# Patient Record
Sex: MALE | Race: WHITE | NOT HISPANIC OR LATINO
[De-identification: names, ages, dates, MRNs, and addresses within clinical notes are randomized per-mention and may not be internally consistent; named-entity substitution may affect disease eponyms.]

---

## 2018-12-12 ENCOUNTER — APPOINTMENT (OUTPATIENT)
Dept: PULMONOLOGY | Facility: CLINIC | Age: 53
End: 2018-12-12
Payer: COMMERCIAL

## 2018-12-12 VITALS
RESPIRATION RATE: 16 BRPM | DIASTOLIC BLOOD PRESSURE: 89 MMHG | WEIGHT: 238 LBS | OXYGEN SATURATION: 97 % | TEMPERATURE: 97.7 F | HEIGHT: 71 IN | SYSTOLIC BLOOD PRESSURE: 145 MMHG | HEART RATE: 59 BPM | BODY MASS INDEX: 33.32 KG/M2

## 2018-12-12 DIAGNOSIS — Z87.898 PERSONAL HISTORY OF OTHER SPECIFIED CONDITIONS: ICD-10-CM

## 2018-12-12 PROCEDURE — 99204 OFFICE O/P NEW MOD 45 MIN: CPT

## 2019-01-04 ENCOUNTER — APPOINTMENT (OUTPATIENT)
Dept: ORTHOPEDIC SURGERY | Facility: CLINIC | Age: 54
End: 2019-01-04
Payer: COMMERCIAL

## 2019-01-04 VITALS
DIASTOLIC BLOOD PRESSURE: 78 MMHG | SYSTOLIC BLOOD PRESSURE: 142 MMHG | WEIGHT: 235 LBS | BODY MASS INDEX: 35.61 KG/M2 | HEIGHT: 68 IN | HEART RATE: 73 BPM

## 2019-01-04 DIAGNOSIS — L40.9 PSORIASIS, UNSPECIFIED: ICD-10-CM

## 2019-01-04 DIAGNOSIS — Z86.59 PERSONAL HISTORY OF OTHER MENTAL AND BEHAVIORAL DISORDERS: ICD-10-CM

## 2019-01-04 PROCEDURE — 99244 OFF/OP CNSLTJ NEW/EST MOD 40: CPT

## 2019-01-04 PROCEDURE — 72100 X-RAY EXAM L-S SPINE 2/3 VWS: CPT

## 2019-01-04 RX ORDER — IBUPROFEN 800 MG/1
800 TABLET, FILM COATED ORAL
Qty: 90 | Refills: 0 | Status: ACTIVE | COMMUNITY
Start: 2019-01-04 | End: 1900-01-01

## 2019-01-07 PROBLEM — L40.9 PSORIASIS: Status: ACTIVE | Noted: 2019-01-07

## 2019-01-07 PROBLEM — Z86.59 HISTORY OF PANIC ATTACKS: Status: RESOLVED | Noted: 2019-01-07 | Resolved: 2019-01-07

## 2019-01-07 RX ORDER — DULOXETINE HYDROCHLORIDE 60 MG/1
60 CAPSULE, DELAYED RELEASE PELLETS ORAL
Qty: 30 | Refills: 0 | Status: ACTIVE | COMMUNITY
Start: 2018-12-19

## 2019-01-28 ENCOUNTER — APPOINTMENT (OUTPATIENT)
Dept: ORTHOPEDIC SURGERY | Facility: CLINIC | Age: 54
End: 2019-01-28
Payer: COMMERCIAL

## 2019-01-28 PROCEDURE — 99214 OFFICE O/P EST MOD 30 MIN: CPT

## 2019-01-28 NOTE — PHYSICAL EXAM
[de-identified] : He appears comfortable today. Motor power is normal in her lower extremity groups and straight leg raising is negative to 90°.

## 2019-01-28 NOTE — DISCUSSION/SUMMARY
[Medication Risks Reviewed] : Medication risks reviewed [de-identified] : He will continue the ibuprofen 800 mg 3 times a day for 5 or 6 days after he has no ache or pain, only a discomfort her last. He was a low dose to 600 mg 3 times a day and I will see him for followup in 4 weeks. He will call if there are problems with the medication.

## 2019-01-28 NOTE — HISTORY OF PRESENT ILLNESS
[de-identified] : His problems tolerating the ibuprofen. The lower back pain which she describes as constant and graded as an 8 at the time of his last visit it is now intermittent and no worse than an aching. It continues to be worse with standing and sitting for prolonged periods of time. There are no new symptoms. [Pain Location] : pain [Improving] : improving [3] : an average pain level of 3/10 [Intermit.] : ~He/She~ states the symptoms seem to be intermittent

## 2019-01-28 NOTE — REASON FOR VISIT
[Follow-Up Visit] : a follow-up visit for [Degenerative Joint Disease] : degenerative joint disease [Back Pain] : back pain [FreeTextEntry2] : Lower back pain

## 2019-02-06 ENCOUNTER — APPOINTMENT (OUTPATIENT)
Dept: ORTHOPEDIC SURGERY | Facility: CLINIC | Age: 54
End: 2019-02-06
Payer: COMMERCIAL

## 2019-02-06 PROCEDURE — 99214 OFFICE O/P EST MOD 30 MIN: CPT

## 2019-02-06 NOTE — HISTORY OF PRESENT ILLNESS
[de-identified] : He was making very nice improvement on the ibuprofen and his symptoms have gone from a constant 8 to an intermittent aching graded as a 3. She was twisting 5 days ago when lifting a computer bag and had a sudden worsening of his symptoms. He has had some nausea from the ibuprofen. [Pain Location] : pain [Worsening] : worsening [5] : a current pain level of 5/10

## 2019-02-06 NOTE — REASON FOR VISIT
[Follow-Up Visit] : a follow-up visit for [Degenerative Joint Disease] : degenerative joint disease [Back Pain] : back pain [FreeTextEntry2] : Low back pain

## 2019-02-06 NOTE — DISCUSSION/SUMMARY
[Medication Risks Reviewed] : Medication risks reviewed [de-identified] : He is instructed on omeprazole 20 mg once a day and will resume the ibuprofen at 800 mg 3 times a day as recommended at the time of his last visit. He has a followup appointment later this month.

## 2019-02-07 ENCOUNTER — CHART COPY (OUTPATIENT)
Age: 54
End: 2019-02-07

## 2019-02-25 ENCOUNTER — APPOINTMENT (OUTPATIENT)
Dept: ORTHOPEDIC SURGERY | Facility: CLINIC | Age: 54
End: 2019-02-25
Payer: COMMERCIAL

## 2019-02-25 PROCEDURE — 99214 OFFICE O/P EST MOD 30 MIN: CPT

## 2019-02-25 NOTE — REASON FOR VISIT
[Follow-Up Visit] : a follow-up visit for [Back Pain] : back pain [FreeTextEntry2] : Lower back pain

## 2019-02-25 NOTE — PHYSICAL EXAM
[de-identified] : He appears comfortable today. Reflexes are 2+ and symmetrical with normal motor power and a negative straight leg raising to 90°.

## 2019-02-25 NOTE — DISCUSSION/SUMMARY
[Medication Risks Reviewed] : Medication risks reviewed [de-identified] : he will increase the ibuprofen to 600 mg 3 times a day and continue the omeprazole. I will see him for followup in 3 weeks.

## 2019-02-25 NOTE — HISTORY OF PRESENT ILLNESS
[de-identified] : He was initially seen with lower back pain in early January and several weeks later the symptoms went from constant and quite severe to intermittent doing markedly better. He had a temporary worsening of his symptoms bending over to  his computer bag and since then the symptoms have again improved. He had some GI upset with the ibuprofen that resolved with omeprazole. Currently the symptoms on an intermittent aching in the lower back but he is taking the ibuprofen 600 mg only once a day.

## 2019-03-06 NOTE — ASSESSMENT
[FreeTextEntry1] : Patient is a 53-year-old male with a history of alcoholism, hypertension, hyperlipidemia, and hiatal hernia. \par Last seen in 2013 for severe GAGE. He has been using CPAP at a pressure of 10 cm H2O. Presents today as machine is broken and needs a new machine. He has lost 20 lbs since his prior visit. Does not have his device or data card with him for review of therapeutic data. Will order new APAP machine through [facility]. Instructed to contact office if don't hear from [facility] in 1-2 weeks. The compliance criteria were discussed and he was instructed to follow up with us within 2-3 months receiving the equipment.

## 2019-03-06 NOTE — HISTORY OF PRESENT ILLNESS
[FreeTextEntry1] : Patient is a 53 year old male with a history of alcoholism, hypertension, hyperlipidemia, and hiatal hernia here to re-establish care for GAGE.\par \par He was last seen in 2013. PSG (7/10/2013) showed severe GAGE with an AHI of 43/hr and a T90 of 5.9%.  Primary symptoms were excessive daytime sleepiness, snoring, nasal congestion, dry mouth in the mornings, drowsy driving as well as falling asleep during periods of inactivity. ESS at that time was 15.  \par A subsequent CPAP titration (9/11/2013) showed an optimal pressure of 10 cm H2O. A Juan Respironics machine was ordered for him through [facility]. Mask is a small nasal mask.  He is endorsing compliance and benefit with CPAP.  Since last visit has lost 20 lbs.  ESS 5 with use of CPAP.  Presents today as machine is broken and needs a new machine.  Other sleep-related symptoms and sleep schedule are as listed below.\par  [Obstructive Sleep Apnea] : obstructive sleep apnea [Snoring] : snoring [Daytime Somnolence] : daytime somnolence [ESS: ___] : ESS score [unfilled] [Unintentional Sleep While Inactive] : unintentional sleep while inactive [Awakes Unrefreshed] : awakening unrefreshed [Awakening With Dry Mouth] : awakening with dry mouth [To Bed: ___] : ~he/she~ goes to bed at [unfilled] [Arises: ___] : arises at [unfilled] [Sleep Onset Latency: ___ minutes] : sleep onset latency of [unfilled] minutes reported [Nocturnal Awakenings: ___] : ~he/she~ typically has [unfilled] nocturnal awakenings [TST: ___] : Total sleep time is [unfilled] [AHI: ___ per hour] : Apnea-hypopnea index:  [unfilled] per hour [T90%: ___] : T90%: [unfilled]% [Date: ___] : the most recent therapeutic polysomnogram was completed [unfilled] [CPAP: ___ cmH2O] : CPAP: [unfilled] cmH2O

## 2019-03-06 NOTE — REVIEW OF SYSTEMS
[Recent Wt Loss (___ Lbs)] : recent [unfilled] ~Ulb weight loss [Obesity] : obesity [Anxious] : anxious [Irresistible urge to move legs] : no irresistible urge to move legs because of lower extremity discomfort [LE discomfort relieved by movement] : lower extremity discomfort not relieved by movement [Late day/ Evening symptoms] : no late day/evening symptoms [Sleep Disturbances due to LE symptoms] : ~T no sleep disturbances due to lower extremity symptoms [Unusual Sleep Behavior] : no unusual sleep behavior [Negative] : Musculoskeletal [FreeTextEntry9] : HTN, HLD [EDS: ESS=____] : daytime somnolence: ESS=[unfilled] [Fatigue] : not feeling tired (fatigue) [Nasal Congestion] : no nasal congestion [Postnasal Drip] : no postnasal drip [Shortness Of Breath] : not expressed as feeling short of breath [A.M. Dry Mouth] : no a.m. dry mouth [Chest Pain] : no chest pain [Palpitations] : no palpitations [Edema] : ~T edema was not present [A.M. Headache] : no headache present upon awakening [Leg Dysesthesias] : no leg dysesthesias [Arthralgias] : no arthralgias [Snoring] : snoring [Witnessed Apneas] : demonstrated no ~M apnea [Frequent Nocturnal Awakenings] : no nocturnal awakenings from sleep [Daytime Somnolence: ESS=____] : daytime somnolence: ESS=[unfilled] [Unintentional Sleep while active] : no unintentional sleep while active [Unintentional Sleep while inactive] : unintentional sleep while inactive [Awakes Unrefreshed] : nonrestorative sleep [Awakes With Headache] : awakes without a headache [Awakes With Dry Mouth] : awakes with dry mouth [Recent Wt Gain (___ Lbs)] : recent [unfilled] ~Ulb weight gain [Difficulty Initiating Sleep] : no difficulty falling asleep [Difficulty Maintaining Sleep] : no difficulty maintaining sleep [Lower Extremity Discomfort] : no lower extremity discomfort [FreeTextEntry1] : 11:30-12am [FreeTextEntry2] : 7 am [FreeTextEntry3] : within 5 minutes [FreeTextEntry4] : once for nocturia [FreeTextEntry5] : few minutes [FreeTextEntry6] : 6 hours [FreeTextEntry7] : unintentional naps during periods of inactivity.

## 2019-03-07 ENCOUNTER — APPOINTMENT (OUTPATIENT)
Dept: PULMONOLOGY | Facility: CLINIC | Age: 54
End: 2019-03-07

## 2019-03-12 ENCOUNTER — APPOINTMENT (OUTPATIENT)
Dept: PULMONOLOGY | Facility: CLINIC | Age: 54
End: 2019-03-12
Payer: COMMERCIAL

## 2019-03-12 VITALS
BODY MASS INDEX: 36.37 KG/M2 | OXYGEN SATURATION: 95 % | TEMPERATURE: 98.1 F | DIASTOLIC BLOOD PRESSURE: 91 MMHG | WEIGHT: 240 LBS | SYSTOLIC BLOOD PRESSURE: 159 MMHG | RESPIRATION RATE: 18 BRPM | HEIGHT: 68 IN | HEART RATE: 73 BPM

## 2019-03-12 DIAGNOSIS — G47.33 OBSTRUCTIVE SLEEP APNEA (ADULT) (PEDIATRIC): ICD-10-CM

## 2019-03-12 DIAGNOSIS — Z87.09 PERSONAL HISTORY OF OTHER DISEASES OF THE RESPIRATORY SYSTEM: ICD-10-CM

## 2019-03-12 DIAGNOSIS — J40 BRONCHITIS, NOT SPECIFIED AS ACUTE OR CHRONIC: ICD-10-CM

## 2019-03-12 DIAGNOSIS — J45.909 UNSPECIFIED ASTHMA, UNCOMPLICATED: ICD-10-CM

## 2019-03-12 DIAGNOSIS — R06.2 WHEEZING: ICD-10-CM

## 2019-03-12 PROCEDURE — 99215 OFFICE O/P EST HI 40 MIN: CPT

## 2019-03-12 RX ORDER — MOMETASONE FUROATE 200 UG/1
200 AEROSOL RESPIRATORY (INHALATION)
Qty: 1 | Refills: 3 | Status: ACTIVE | COMMUNITY
Start: 2019-03-12 | End: 1900-01-01

## 2019-03-13 NOTE — HISTORY OF PRESENT ILLNESS
[Obstructive Sleep Apnea] : obstructive sleep apnea [Snoring] : snoring [Daytime Somnolence] : daytime somnolence [ESS: ___] : ESS score [unfilled] [Unintentional Sleep While Inactive] : unintentional sleep while inactive [Awakes Unrefreshed] : awakening unrefreshed [Awakening With Dry Mouth] : awakening with dry mouth [To Bed: ___] : ~he/she~ goes to bed at [unfilled] [Arises: ___] : arises at [unfilled] [Sleep Onset Latency: ___ minutes] : sleep onset latency of [unfilled] minutes reported [Nocturnal Awakenings: ___] : ~he/she~ typically has [unfilled] nocturnal awakenings [TST: ___] : Total sleep time is [unfilled] [AHI: ___ per hour] : Apnea-hypopnea index:  [unfilled] per hour [T90%: ___] : T90%: [unfilled]% [Date: ___] : the most recent therapeutic polysomnogram was completed [unfilled] [CPAP: ___ cmH2O] : CPAP: [unfilled] cmH2O [FreeTextEntry1] : Patient is a 53 year old male with a history of alcoholism, hypertension, hyperlipidemia, and hiatal hernia here for compliance apt and bronchitisis URI w/u. \par \par PSG (7/10/2013) showed severe GAGE with an AHI of 43/hr and a T90 of 5.9%.  Primary symptoms were excessive daytime sleepiness, snoring, nasal congestion, dry mouth in the mornings, drowsy driving as well as falling asleep during periods of inactivity. ESS at that time was 15.  \par A subsequent CPAP titration (9/11/2013) showed an optimal pressure of 10 cm H2O. A Juan Resp[employer] machine was ordered for him through [facility]. Mask is a small nasal mask.  He is endorsing compliance and benefit with CPAP.  Since last visit has lost 20 lbs.  ESS 5 with use of CPAP.  \par \par Tried new machine but felt as though not getting enough air/pressure when wearing. His machine is an APAP with min of 4 and maximum of 20mc H20. He stopped using as it was waking him up with the sensation of not being able to breath. Went back to old machine that is currently held together with duct tape. Endorsing benefit with PAP therapy.  Old PAP machine is set at pressure of [id number]. He is wearing nightly for at least 4 hours.\par \par PULM: endorsing recently traveled to [place] and developed cough that worsened last week after return home. Went to [facility] on Saturday for productive cough, wheezing, chest tightness and SOB. Was given Tessalon Pearls, Zpack, Combivent Respimat TID, nebulizer in office x 2 and Solumedrol shot. He went back on Monday for f/u since still not improved. Has CXR performed that was normal was nebulized again in office and instructed to continue meds as prescribed.\par Today endorsing PND sinus congestion and still wheeze despite Combivent TID and Tessalon Pearls TID. \par \par Denies fever/arthalgias/sick contacts/CP/chills/n/v/d\par \par Denies h/o lung disease asthma COPD etc. Never had PNA or bronchitis, never intubated or required oxygen therapy or inhalers.\par Is a former smoker quit in 2010. \par Denies triggers of temp extremes\par Lives in a home with wood floors and a dog that is hypoallergenic\par Denies social/environmental exposures except former smoker. \par

## 2019-03-13 NOTE — ASSESSMENT
[FreeTextEntry1] : Patient is a 53-year-old male with a history of alcoholism, hypertension, hyperlipidemia, and hiatal hernia. \par Presents today for compliance for PAP device as well as URI w/u.\par \par GAGE-received new machine January 2019 APAP 4-20cm H20 [facility]. Endorsing that not enough pressure and is awakening him with sensation of can't breath. Will increase min pressure to 8-20cm H20 and pt will f/u if improved.\par Went back to using old machine in meantime. Endorsing benefit from PAP therapy ESS improved to 5 on therapy. Wearing nightly for minimum of 4 hours. \par Continue PAP therapy\par Need to f/u in 1 month to reassess pressure change\par \par URI- \par CXR performed clear unable to view today. Will have CD rom scanned in to our system and review\par Currently on day 3 of Zpack, will d/c and switch to Levaquin 500mg for 5 days as not improving.\par Start Asmanex HFA medium dose BID, Continue Combivent Respimat prn Q4h for wheezing\par Start Prednisone 20mg daily for 5 days\par \par DC singulair - was started by urgent care center. \par F/u in 1 month with full PFT, and PAP compliance, or sooner if not improved

## 2019-03-13 NOTE — PHYSICAL EXAM
[General Appearance - Well Developed] : well developed [Well Groomed] : well groomed [General Appearance - Well Nourished] : well nourished [General Appearance - In No Acute Distress] : no acute distress [Normal Conjunctiva] : the conjunctiva exhibited no abnormalities [III] : III [Heart Rate And Rhythm] : heart rate was normal and rhythm regular [Heart Sounds] : normal S1 and S2 [Murmurs] : no murmurs [Abnormal Walk] : normal gait [Nail Clubbing] : no clubbing of the fingernails [Skin Color & Pigmentation] : normal skin color and pigmentation [No Focal Deficits] : no focal deficits [Oriented To Time, Place, And Person] : oriented to person, place, and time [Impaired Insight] : insight and judgment were intact [Affect] : the affect was normal [Bowel Sounds] : normal bowel sounds [Abdomen Soft] : soft [Abdomen Tenderness] : non-tender [] : no hepato-splenomegaly [Abdomen Mass (___ Cm)] : no abdominal mass palpated [FreeTextEntry1] : left side crackles with expiratory wheeze present

## 2019-03-13 NOTE — REVIEW OF SYSTEMS
[EDS: ESS=____] : daytime somnolence: ESS=[unfilled] [Recent Wt Loss (___ Lbs)] : recent [unfilled] ~Ulb weight loss [Obesity] : obesity [Anxious] : anxious [Negative] : Musculoskeletal [Snoring] : snoring [Daytime Somnolence: ESS=____] : daytime somnolence: ESS=[unfilled] [Unintentional Sleep while inactive] : unintentional sleep while inactive [Awakes Unrefreshed] : nonrestorative sleep [Awakes With Dry Mouth] : awakes with dry mouth [Recent Wt Gain (___ Lbs)] : recent [unfilled] ~Ulb weight gain [Nasal Congestion] : nasal congestion [Postnasal Drip] : postnasal drip [Shortness Of Breath] : shortness of breath [Difficulty Initiating Sleep] : no difficulty falling asleep [Difficulty Maintaining Sleep] : no difficulty maintaining sleep [Lower Extremity Discomfort] : no lower extremity discomfort [Irresistible urge to move legs] : no irresistible urge to move legs because of lower extremity discomfort [LE discomfort relieved by movement] : lower extremity discomfort not relieved by movement [Late day/ Evening symptoms] : no late day/evening symptoms [Sleep Disturbances due to LE symptoms] : ~T no sleep disturbances due to lower extremity symptoms [Unusual Sleep Behavior] : no unusual sleep behavior [FreeTextEntry9] : HTN, HLD [Witnessed Apneas] : demonstrated no ~M apnea [Frequent Nocturnal Awakenings] : no nocturnal awakenings from sleep [Unintentional Sleep while active] : no unintentional sleep while active [Awakes With Headache] : awakes without a headache [FreeTextEntry1] : 11:30-12am [FreeTextEntry2] : 7 am [FreeTextEntry3] : within 5 minutes [FreeTextEntry4] : once for nocturia [FreeTextEntry5] : few minutes [FreeTextEntry6] : 6 hours [FreeTextEntry7] : unintentional naps during periods of inactivity.

## 2019-03-13 NOTE — END OF VISIT
[] : nurse practitioner [>50% of Time Spent on Counseling for ____] : Greater than 50% of the encounter time was spent on counseling for [unfilled] [Time Spent: ___ minutes] : I have spent [unfilled] minutes of face to face time with the patient [FreeTextEntry3] : I agree with the nurse practitioners history, physical examination and plan of care. I personally elicited a history and examined the patient\par \par

## 2019-03-18 ENCOUNTER — APPOINTMENT (OUTPATIENT)
Dept: ORTHOPEDIC SURGERY | Facility: CLINIC | Age: 54
End: 2019-03-18
Payer: COMMERCIAL

## 2019-03-18 VITALS
OXYGEN SATURATION: 95 % | SYSTOLIC BLOOD PRESSURE: 139 MMHG | BODY MASS INDEX: 36.37 KG/M2 | HEIGHT: 68 IN | HEART RATE: 64 BPM | DIASTOLIC BLOOD PRESSURE: 89 MMHG | TEMPERATURE: 97 F | RESPIRATION RATE: 18 BRPM | WEIGHT: 240 LBS

## 2019-03-18 DIAGNOSIS — M47.816 SPONDYLOSIS W/OUT MYELOPATHY OR RADICULOPATHY, LUMBAR REGION: ICD-10-CM

## 2019-03-18 DIAGNOSIS — M54.5 LOW BACK PAIN: ICD-10-CM

## 2019-03-18 PROCEDURE — 99214 OFFICE O/P EST MOD 30 MIN: CPT

## 2019-03-18 NOTE — DISCUSSION/SUMMARY
[Medication Risks Reviewed] : Medication risks reviewed [de-identified] : He is off antibiotics at this point he will take 2 Aleve twice a day for 5 days after his symptoms have fully resolved and then discontinue anti-inflammatory medication altogether. I will see him for followup on a p.r.n. basis per

## 2019-03-18 NOTE — HISTORY OF PRESENT ILLNESS
[de-identified] : His lower back symptoms were improved at the time of his last visit and an intermittent aching. He increase the ibuprofen to 3 times a day but then had to stop that several days ago as he was on antibiotics for an upper respiratory tract infection and getting stomach symptoms from the antibiotics. Currently he has only an occasional awareness in his lower back. He recently finished the medicines for his right upper tract infection. [Pain Location] : pain [Improving] : improving [1] : a maximum pain level of 1/10 [Intermit.] : ~He/She~ states the symptoms seem to be intermittent

## 2019-05-09 ENCOUNTER — APPOINTMENT (OUTPATIENT)
Dept: PULMONOLOGY | Facility: CLINIC | Age: 54
End: 2019-05-09
Payer: COMMERCIAL

## 2019-05-09 DIAGNOSIS — J45.909 UNSPECIFIED ASTHMA, UNCOMPLICATED: ICD-10-CM

## 2019-05-09 PROCEDURE — ZZZZZ: CPT

## 2019-05-09 PROCEDURE — 94729 DIFFUSING CAPACITY: CPT

## 2019-05-09 PROCEDURE — 99214 OFFICE O/P EST MOD 30 MIN: CPT | Mod: 25

## 2019-05-09 PROCEDURE — 94060 EVALUATION OF WHEEZING: CPT

## 2019-05-09 PROCEDURE — 94726 PLETHYSMOGRAPHY LUNG VOLUMES: CPT

## 2019-05-09 RX ORDER — RAMIPRIL 2.5 MG/1
2.5 CAPSULE ORAL
Qty: 90 | Refills: 0 | Status: DISCONTINUED | COMMUNITY
Start: 2018-10-25 | End: 2019-05-09

## 2019-05-09 RX ORDER — LEVOFLOXACIN 500 MG/1
500 TABLET, FILM COATED ORAL
Qty: 5 | Refills: 0 | Status: DISCONTINUED | COMMUNITY
Start: 2019-03-12 | End: 2019-05-09

## 2019-05-09 RX ORDER — PREDNISONE 20 MG/1
20 TABLET ORAL
Qty: 5 | Refills: 0 | Status: DISCONTINUED | COMMUNITY
Start: 2019-03-12 | End: 2019-05-09

## 2019-05-09 RX ORDER — ALBUTEROL SULFATE 90 UG/1
108 (90 BASE) AEROSOL, METERED RESPIRATORY (INHALATION)
Qty: 1 | Refills: 6 | Status: ACTIVE | COMMUNITY
Start: 2019-05-09 | End: 1900-01-01

## 2019-05-09 NOTE — REVIEW OF SYSTEMS
[EDS: ESS=____] : daytime somnolence: ESS=[unfilled] [Recent Wt Loss (___ Lbs)] : recent [unfilled] ~Ulb weight loss [Nasal Congestion] : nasal congestion [Postnasal Drip] : postnasal drip [Shortness Of Breath] : shortness of breath [Obesity] : obesity [Anxious] : anxious [Difficulty Initiating Sleep] : no difficulty falling asleep [Difficulty Maintaining Sleep] : no difficulty maintaining sleep [Lower Extremity Discomfort] : no lower extremity discomfort [Irresistible urge to move legs] : no irresistible urge to move legs because of lower extremity discomfort [Late day/ Evening symptoms] : no late day/evening symptoms [LE discomfort relieved by movement] : lower extremity discomfort not relieved by movement [Sleep Disturbances due to LE symptoms] : ~T no sleep disturbances due to lower extremity symptoms [Unusual Sleep Behavior] : no unusual sleep behavior [Negative] : Musculoskeletal [FreeTextEntry9] : HTN, HLD [Snoring] : snoring [Witnessed Apneas] : demonstrated no ~M apnea [Frequent Nocturnal Awakenings] : no nocturnal awakenings from sleep [Daytime Somnolence: ESS=____] : daytime somnolence: ESS=[unfilled] [Unintentional Sleep while active] : no unintentional sleep while active [Unintentional Sleep while inactive] : unintentional sleep while inactive [Awakes Unrefreshed] : nonrestorative sleep [Awakes With Headache] : awakes without a headache [Awakes With Dry Mouth] : awakes with dry mouth [Recent Wt Gain (___ Lbs)] : recent [unfilled] ~Ulb weight gain [FreeTextEntry1] : 11:30-12am [FreeTextEntry2] : 7 am [FreeTextEntry3] : within 5 minutes [FreeTextEntry4] : once for nocturia [FreeTextEntry5] : few minutes [FreeTextEntry6] : 6 hours [FreeTextEntry7] : unintentional naps during periods of inactivity.

## 2019-05-09 NOTE — HISTORY OF PRESENT ILLNESS
[Obstructive Sleep Apnea] : obstructive sleep apnea [Snoring] : no snoring [Daytime Somnolence] : no daytime somnolence [Unintentional Sleep While Inactive] : no unintentional sleep while inactive [Awakes Unrefreshed] : does not awake unrefreshed [Awakening With Dry Mouth] : no dry mouth upon awakening [To Bed: ___] : ~he/she~ goes to bed at [unfilled] [Arises: ___] : arises at [unfilled] [Sleep Onset Latency: ___ minutes] : sleep onset latency of [unfilled] minutes reported [Nocturnal Awakenings: ___] : ~he/she~ typically has [unfilled] nocturnal awakenings [TST: ___] : Total sleep time is [unfilled] [AHI: ___ per hour] : Apnea-hypopnea index:  [unfilled] per hour [T90%: ___] : T90%: [unfilled]% [Date: ___] : the most recent therapeutic polysomnogram was completed [unfilled] [CPAP: ___ cmH2O] : CPAP: [unfilled] cmH2O [FreeTextEntry1] : Patient is a 54 year old male with a history of alcoholism, hypertension, hyperlipidemia, and hiatal hernia here for compliance apt and bronchitisis URI w/u. \par \par PSG (7/10/2013) showed severe GAGE with an AHI of 43/hr and a T90 of 5.9%.  Primary symptoms were excessive daytime sleepiness, snoring, nasal congestion, dry mouth in the mornings, drowsy driving as well as falling asleep during periods of inactivity. ESS at that time was 15.  \par A subsequent CPAP titration (9/11/2013) showed an optimal pressure of 10 cm H2O. A Juan Resp[employer] machine was ordered for him through [facility]. Mask is a small nasal mask.  He is endorsing compliance and benefit with CPAP.  Since last visit has lost 20 lbs.  ESS 5 with use of CPAP.  \par \par Tried new machine but felt as though not getting enough air/pressure when wearing. His machine is an APAP with min of 4 and maximum of 20mc H20. He stopped using as it was waking him up with the sensation of not being able to breath. Went back to old machine that is currently held together with duct tape. Endorsing benefit with PAP therapy.  Old PAP machine is set at pressure of [id number]. He is wearing nightly for at least 4 hours.\par \par PULM: pt s/p strep throat with asthma exacerbation one month ago. \par Was prescribed Combivent by urgent care doc. Now using about 2 times per week. Improved asthma symptoms. Self-stopped asmanex about 1-2 weeks ago, didn't know was maintenance. \par \par Denies fever/arthalgias/sick contacts/CP/chills/n/v/d\par \par

## 2019-05-09 NOTE — PHYSICAL EXAM
[General Appearance - Well Developed] : well developed [Well Groomed] : well groomed [General Appearance - Well Nourished] : well nourished [General Appearance - In No Acute Distress] : no acute distress [Normal Conjunctiva] : the conjunctiva exhibited no abnormalities [Nail Clubbing] : no clubbing of the fingernails [No Focal Deficits] : no focal deficits [Oriented To Time, Place, And Person] : oriented to person, place, and time [Impaired Insight] : insight and judgment were intact [Affect] : the affect was normal [III] : III [FreeTextEntry1] : -BRANDEN [Heart Rate And Rhythm] : heart rate was normal and rhythm regular [Heart Sounds] : normal S1 and S2 [Murmurs] : no murmurs [Exaggerated Use Of Accessory Muscles For Inspiration] : no accessory muscle use [Auscultation Breath Sounds / Voice Sounds] : lungs were clear to auscultation bilaterally [Bowel Sounds] : normal bowel sounds [Abdomen Tenderness] : non-tender [Abdomen Soft] : soft [] : no hepato-splenomegaly [Abdomen Mass (___ Cm)] : no abdominal mass palpated [Abnormal Walk] : normal gait [Skin Color & Pigmentation] : normal skin color and pigmentation

## 2019-05-09 NOTE — ASSESSMENT
[FreeTextEntry1] : Patient is a 54-year-old male with a history of alcoholism, hypertension, hyperlipidemia, and hiatal hernia. \par Presents today for compliance for PAP device as well as URI w/u.\par \par GAGE-received new machine January 2019 APAP 4-20cm H20 [facility]. Endorsing that not enough pressure and is awakening him with sensation of can't breath so increase min pressure to 8-20cm H20 and pt reports much improvement. \par Reports he occasionally sleeps in another room because noise disturbs wife's sleep so he uses his old CPAP set at 8 cm H2O (per pt) in other room). Compliance/therapy data on new APAP on 8-20 cm H2O shows usage for at least 4 hours, normalized AHI. \par \par Endorsing benefit from PAP therapy with improved daytime somnolence, more restorative sleep. CONTINUE PAP THERAPY.\par \par Pulm - Asthma - pt s/p strep throat with asthma exacerbation one month ago. \par Was prescribed Combivent by urgent care doc. Now using about 2 times per week. Improved asthma symptoms. Self-stopped asmanex about 1-2 weeks ago, didn't know was maintenance.\par CXR 3/11/19 - report clear lungs. \par Continue Asmanex HFA medium dose BID, Switch Combivent Respimat to proair prn Q4h for wheezing\par \par f/u in 4 month/september.\par \par DC singulair - was started by urgent care center. \par F/u in 1 month with full PFT, and PAP compliance, or sooner if not improved

## 2019-09-17 ENCOUNTER — APPOINTMENT (OUTPATIENT)
Dept: PULMONOLOGY | Facility: CLINIC | Age: 54
End: 2019-09-17

## 2019-09-23 ENCOUNTER — APPOINTMENT (OUTPATIENT)
Dept: OTOLARYNGOLOGY | Facility: CLINIC | Age: 54
End: 2019-09-23

## 2020-12-11 ENCOUNTER — OUTPATIENT (OUTPATIENT)
Dept: OUTPATIENT SERVICES | Facility: HOSPITAL | Age: 55
LOS: 1 days | Discharge: HOME | End: 2020-12-11

## 2020-12-11 DIAGNOSIS — F10.20 ALCOHOL DEPENDENCE, UNCOMPLICATED: ICD-10-CM

## 2020-12-14 DIAGNOSIS — F17.200 NICOTINE DEPENDENCE, UNSPECIFIED, UNCOMPLICATED: ICD-10-CM

## 2020-12-14 DIAGNOSIS — F41.1 GENERALIZED ANXIETY DISORDER: ICD-10-CM

## 2020-12-14 DIAGNOSIS — E78.2 MIXED HYPERLIPIDEMIA: ICD-10-CM

## 2020-12-14 DIAGNOSIS — F32.9 MAJOR DEPRESSIVE DISORDER, SINGLE EPISODE, UNSPECIFIED: ICD-10-CM

## 2020-12-21 PROBLEM — Z87.09 HISTORY OF UPPER RESPIRATORY INFECTION: Status: RESOLVED | Noted: 2019-03-12 | Resolved: 2020-12-21

## 2022-03-09 ENCOUNTER — OUTPATIENT (OUTPATIENT)
Dept: OUTPATIENT SERVICES | Facility: HOSPITAL | Age: 57
LOS: 1 days | End: 2022-03-09

## 2022-03-09 DIAGNOSIS — M54.50 LOW BACK PAIN, UNSPECIFIED: ICD-10-CM

## 2022-03-17 ENCOUNTER — NON-APPOINTMENT (OUTPATIENT)
Age: 57
End: 2022-03-17

## 2022-03-17 ENCOUNTER — APPOINTMENT (OUTPATIENT)
Dept: CARDIOLOGY | Facility: CLINIC | Age: 57
End: 2022-03-17
Payer: COMMERCIAL

## 2022-03-17 VITALS
BODY MASS INDEX: 35 KG/M2 | HEIGHT: 71 IN | SYSTOLIC BLOOD PRESSURE: 152 MMHG | DIASTOLIC BLOOD PRESSURE: 84 MMHG | OXYGEN SATURATION: 99 % | HEART RATE: 62 BPM | WEIGHT: 250 LBS

## 2022-03-17 VITALS — DIASTOLIC BLOOD PRESSURE: 80 MMHG | SYSTOLIC BLOOD PRESSURE: 144 MMHG

## 2022-03-17 DIAGNOSIS — Z78.9 OTHER SPECIFIED HEALTH STATUS: ICD-10-CM

## 2022-03-17 DIAGNOSIS — Z87.891 PERSONAL HISTORY OF NICOTINE DEPENDENCE: ICD-10-CM

## 2022-03-17 DIAGNOSIS — F10.21 ALCOHOL DEPENDENCE, IN REMISSION: ICD-10-CM

## 2022-03-17 DIAGNOSIS — Z82.49 FAMILY HISTORY OF ISCHEMIC HEART DISEASE AND OTHER DISEASES OF THE CIRCULATORY SYSTEM: ICD-10-CM

## 2022-03-17 DIAGNOSIS — Z82.3 FAMILY HISTORY OF STROKE: ICD-10-CM

## 2022-03-17 DIAGNOSIS — F17.200 NICOTINE DEPENDENCE, UNSPECIFIED, UNCOMPLICATED: ICD-10-CM

## 2022-03-17 DIAGNOSIS — E11.9 TYPE 2 DIABETES MELLITUS W/OUT COMPLICATIONS: ICD-10-CM

## 2022-03-17 DIAGNOSIS — Z83.42 FAMILY HISTORY OF FAMILIAL HYPERCHOLESTEROLEMIA: ICD-10-CM

## 2022-03-17 PROCEDURE — 99204 OFFICE O/P NEW MOD 45 MIN: CPT | Mod: 25

## 2022-03-17 PROCEDURE — 93000 ELECTROCARDIOGRAM COMPLETE: CPT

## 2022-03-17 RX ORDER — RAMIPRIL 1.25 MG/1
1.25 CAPSULE ORAL DAILY
Refills: 0 | Status: DISCONTINUED | COMMUNITY
Start: 2018-12-12 | End: 2022-03-17

## 2022-03-17 RX ORDER — OMEPRAZOLE 20 MG/1
20 CAPSULE, DELAYED RELEASE ORAL DAILY
Qty: 30 | Refills: 1 | Status: DISCONTINUED | COMMUNITY
Start: 2019-02-07 | End: 2022-03-17

## 2022-03-17 RX ORDER — HYDROXYZINE HYDROCHLORIDE 50 MG/1
50 TABLET ORAL
Refills: 0 | Status: ACTIVE | COMMUNITY

## 2022-03-17 RX ORDER — OMEPRAZOLE 20 MG/1
20 CAPSULE, DELAYED RELEASE ORAL DAILY
Qty: 30 | Refills: 1 | Status: DISCONTINUED | COMMUNITY
Start: 2019-02-25 | End: 2022-03-17

## 2022-03-17 RX ORDER — CLONAZEPAM 0.5 MG/1
0.5 TABLET ORAL 3 TIMES DAILY
Refills: 0 | Status: DISCONTINUED | COMMUNITY
Start: 2018-12-12 | End: 2022-03-17

## 2022-03-17 RX ORDER — NALTREXONE HYDROCHLORIDE 50 MG/1
50 TABLET, FILM COATED ORAL DAILY
Refills: 0 | Status: ACTIVE | COMMUNITY
Start: 2019-05-09

## 2022-03-17 NOTE — DISCUSSION/SUMMARY
[FreeTextEntry1] : 1. Abnormal ECG: patient with risk factors for CAD (male, smoker, HTN, HLD, DM). Recommend pharmacologic nuclear stress testing to evaluate for ischemia of Metoprolol. Please note patient with psoriatic arthritis and joint pain. Recommend echocardiogram. \par \par 2. HTN: continue HCTZ 25mg daily, losartan 50mg daily, Toprol XL 50mg daily (high risk medication with no signs of toxicity). Low salt diet. Goal BP less than 130/80.\par \par 3. HLD: continue atorvastatin 40mg daily. Smoking cessation importance. \par \par 4. GAGE: on CPAP. Will be following up with Dr. Kirk. \par \par Given smoking history will order ABIs.\par \par Follow up after testing.

## 2022-03-17 NOTE — REVIEW OF SYSTEMS
[Feeling Fatigued] : feeling fatigued [Joint Pain] : joint pain [Joint Stiffness] : joint stiffness [Negative] : Neurological [Fever] : no fever [Chills] : no chills [FreeTextEntry5] : see HPI [de-identified] : see HPI [de-identified] : see HPI

## 2022-03-17 NOTE — HISTORY OF PRESENT ILLNESS
[FreeTextEntry1] : 57 year old male, active smoker, psoriatic arthritis, HTN, HLD, EtOH abuse (sober for 5 months), GAGE on CPAP, and DM presents for a cardiac evaluation. Patient states he hasn't been seen by a cardiologist in many years. Patient is sedanary but has no chest pain, SOB, or palpitations. \par \par There is no history of MI, CVA, CHF, or previous coronary intervention.\par

## 2022-03-17 NOTE — PHYSICAL EXAM
[Normal] : moves all extremities, no focal deficits, normal speech [de-identified] : No carotid bruits auscultated bilaterally [de-identified] : psoriatic plaques bilateral elbows

## 2022-03-24 ENCOUNTER — APPOINTMENT (OUTPATIENT)
Dept: RADIOLOGY | Facility: CLINIC | Age: 57
End: 2022-03-24
Payer: COMMERCIAL

## 2022-03-24 PROCEDURE — 71046 X-RAY EXAM CHEST 2 VIEWS: CPT

## 2022-04-01 ENCOUNTER — APPOINTMENT (OUTPATIENT)
Dept: CARDIOLOGY | Facility: CLINIC | Age: 57
End: 2022-04-01
Payer: COMMERCIAL

## 2022-04-01 PROCEDURE — 93015 CV STRESS TEST SUPVJ I&R: CPT

## 2022-04-01 PROCEDURE — A9502: CPT

## 2022-04-01 PROCEDURE — 78452 HT MUSCLE IMAGE SPECT MULT: CPT

## 2022-04-01 PROCEDURE — 93306 TTE W/DOPPLER COMPLETE: CPT

## 2022-04-07 ENCOUNTER — APPOINTMENT (OUTPATIENT)
Dept: CARDIOLOGY | Facility: CLINIC | Age: 57
End: 2022-04-07
Payer: COMMERCIAL

## 2022-04-07 PROCEDURE — 93923 UPR/LXTR ART STDY 3+ LVLS: CPT

## 2022-04-18 ENCOUNTER — APPOINTMENT (OUTPATIENT)
Dept: MRI IMAGING | Facility: CLINIC | Age: 57
End: 2022-04-18

## 2022-04-20 ENCOUNTER — APPOINTMENT (OUTPATIENT)
Dept: CARDIOLOGY | Facility: CLINIC | Age: 57
End: 2022-04-20
Payer: COMMERCIAL

## 2022-04-20 VITALS
OXYGEN SATURATION: 96 % | BODY MASS INDEX: 35 KG/M2 | HEART RATE: 59 BPM | WEIGHT: 250 LBS | HEIGHT: 71 IN | DIASTOLIC BLOOD PRESSURE: 76 MMHG | SYSTOLIC BLOOD PRESSURE: 130 MMHG

## 2022-04-20 DIAGNOSIS — R94.31 ABNORMAL ELECTROCARDIOGRAM [ECG] [EKG]: ICD-10-CM

## 2022-04-20 PROCEDURE — 99215 OFFICE O/P EST HI 40 MIN: CPT

## 2022-04-20 NOTE — PHYSICAL EXAM
[Normal] : moves all extremities, no focal deficits, normal speech [de-identified] : No carotid bruits auscultated bilaterally [de-identified] : psoriatic plaques bilateral elbows

## 2022-04-20 NOTE — CARDIOLOGY SUMMARY
[de-identified] : 03/17/2022, NSR, left axis deviation [de-identified] : 4/1/2022, Pharmacologic Nuclear Stress Testing: no ischemia or evidence of prior infarction noted on SPECT images.\par  [de-identified] : 4/1/2022, LV EF 60-65%, trace MR, ascending aorta and aortic root at 4.2cm, mild-moderate TR with estimated PASP of 36mmHg. [de-identified] : 4/7/2022, SAM: no obstructive flow.

## 2022-04-20 NOTE — DISCUSSION/SUMMARY
[FreeTextEntry1] : 1. Abnormal ECG: patient with risk factors for CAD (male, smoker, HTN, HLD, DM). Recommended pharmacologic nuclear stress testing, which showed no evidence of previous infarction or ischemia. Normal LV systolic function and no significant valvular disease on echocardiogram. \par \par 2. HTN: continue HCTZ 25mg daily, losartan 50mg daily, Toprol XL 50mg daily (high risk medication with no signs of toxicity). Low salt diet. Goal BP less than 130/80.\par \par 3. HLD: continue atorvastatin 40mg daily. Smoking cessation importance. \par \par 4. GAGE: on CPAP. Will be following up with Dr. Kirk. \par \par 5. Dilated Ascending Aorta: root and ascending aorta at 4.2cm. Smoking cessation a must. Follow up echocardiogram in 6 months. Will check abdominal aortic US to screen for AAA. If stable, yearly echocardiogram appropriate. Goal BP less than 130/80.\par \par Follow up in 6 months.

## 2022-04-20 NOTE — REVIEW OF SYSTEMS
[Fever] : no fever [Chills] : no chills [Feeling Fatigued] : feeling fatigued [Joint Pain] : joint pain [Joint Stiffness] : joint stiffness [Negative] : Neurological [FreeTextEntry5] : see HPI [de-identified] : see HPI [de-identified] : see HPI

## 2022-08-25 ENCOUNTER — APPOINTMENT (OUTPATIENT)
Dept: RADIOLOGY | Facility: CLINIC | Age: 57
End: 2022-08-25

## 2022-08-25 PROCEDURE — 72202 X-RAY EXAM SI JOINTS 3/> VWS: CPT

## 2022-09-07 ENCOUNTER — OFFICE (OUTPATIENT)
Dept: URBAN - METROPOLITAN AREA CLINIC 38 | Facility: CLINIC | Age: 57
Setting detail: OPHTHALMOLOGY
End: 2022-09-07
Payer: COMMERCIAL

## 2022-09-07 DIAGNOSIS — H52.13: ICD-10-CM

## 2022-09-07 DIAGNOSIS — E11.9: ICD-10-CM

## 2022-09-07 PROCEDURE — 92015 DETERMINE REFRACTIVE STATE: CPT | Performed by: OPTOMETRIST

## 2022-09-07 PROCEDURE — 92004 COMPRE OPH EXAM NEW PT 1/>: CPT | Performed by: OPTOMETRIST

## 2022-09-07 ASSESSMENT — AXIALLENGTH_DERIVED
OD_AL: 24.8989
OS_AL: 24.9532
OS_AL: 24.9532
OD_AL: 24.9532

## 2022-09-07 ASSESSMENT — REFRACTION_MANIFEST
OS_SPHERE: -1.25
OD_SPHERE: -1.25
OD_AXIS: 100
OS_CYLINDER: -1.75
OD_CYLINDER: -1.75
OS_AXIS: 095
OS_VA1: 20/20
OD_VA1: 20/20-
OU_VA: 20/20

## 2022-09-07 ASSESSMENT — REFRACTION_AUTOREFRACTION
OD_SPHERE: -1.00
OS_AXIS: 095
OS_SPHERE: -1.25
OD_CYLINDER: -2.00
OS_CYLINDER: -1.75
OD_AXIS: 099

## 2022-09-07 ASSESSMENT — KERATOMETRY
OD_K1POWER_DIOPTERS: 42.00
OS_AXISANGLE_DEGREES: 023
OD_K2POWER_DIOPTERS: 42.50
OS_K2POWER_DIOPTERS: 42.50
METHOD_AUTO_MANUAL: AUTO
OS_K1POWER_DIOPTERS: 42.00
OD_AXISANGLE_DEGREES: 020

## 2022-09-07 ASSESSMENT — SPHEQUIV_DERIVED
OD_SPHEQUIV: -2
OD_SPHEQUIV: -2.125
OS_SPHEQUIV: -2.125
OS_SPHEQUIV: -2.125

## 2022-09-07 ASSESSMENT — TONOMETRY
OS_IOP_MMHG: 16
OD_IOP_MMHG: 17

## 2022-09-07 ASSESSMENT — CONFRONTATIONAL VISUAL FIELD TEST (CVF)
OD_FINDINGS: FULL
OS_FINDINGS: FULL

## 2022-09-07 ASSESSMENT — REFRACTION_CURRENTRX
OD_VPRISM_DIRECTION: SV
OS_SPHERE: -1.50
OS_OVR_VA: 20/
OD_OVR_VA: 20/
OS_CYLINDER: -1.25
OD_CYLINDER: -1.50
OS_AXIS: 090
OD_SPHERE: -1.75
OS_VPRISM_DIRECTION: SV
OD_AXIS: 106

## 2022-09-07 ASSESSMENT — VISUAL ACUITY
OD_BCVA: 20/20
OS_BCVA: 20/20-1

## 2022-10-20 ENCOUNTER — APPOINTMENT (OUTPATIENT)
Dept: CARDIOLOGY | Facility: CLINIC | Age: 57
End: 2022-10-20

## 2023-06-22 NOTE — END OF VISIT
[] : nurse practitioner [>50% of Time Spent on Counseling for ____] : Greater than 50% of the encounter time was spent on counseling for [unfilled] [Time Spent: ___ minutes] : I have spent [unfilled] minutes of face to face time with the patient Normal for race

## 2024-05-15 ENCOUNTER — NON-APPOINTMENT (OUTPATIENT)
Age: 59
End: 2024-05-15

## 2024-05-15 ENCOUNTER — APPOINTMENT (OUTPATIENT)
Dept: CARDIOLOGY | Facility: CLINIC | Age: 59
End: 2024-05-15
Payer: MEDICARE

## 2024-05-15 VITALS
HEIGHT: 71 IN | HEART RATE: 59 BPM | BODY MASS INDEX: 32.2 KG/M2 | SYSTOLIC BLOOD PRESSURE: 150 MMHG | DIASTOLIC BLOOD PRESSURE: 82 MMHG | WEIGHT: 230 LBS | OXYGEN SATURATION: 96 %

## 2024-05-15 VITALS — DIASTOLIC BLOOD PRESSURE: 90 MMHG | SYSTOLIC BLOOD PRESSURE: 150 MMHG

## 2024-05-15 DIAGNOSIS — E78.00 PURE HYPERCHOLESTEROLEMIA, UNSPECIFIED: ICD-10-CM

## 2024-05-15 DIAGNOSIS — Z79.899 OTHER LONG TERM (CURRENT) DRUG THERAPY: ICD-10-CM

## 2024-05-15 DIAGNOSIS — I10 ESSENTIAL (PRIMARY) HYPERTENSION: ICD-10-CM

## 2024-05-15 DIAGNOSIS — I77.810 THORACIC AORTIC ECTASIA: ICD-10-CM

## 2024-05-15 PROCEDURE — 99215 OFFICE O/P EST HI 40 MIN: CPT

## 2024-05-15 PROCEDURE — G2211 COMPLEX E/M VISIT ADD ON: CPT

## 2024-05-15 PROCEDURE — 93000 ELECTROCARDIOGRAM COMPLETE: CPT

## 2024-05-15 RX ORDER — LOSARTAN POTASSIUM 100 MG/1
100 TABLET, FILM COATED ORAL DAILY
Qty: 1 | Refills: 3 | Status: ACTIVE | COMMUNITY
Start: 1900-01-01 | End: 1900-01-01

## 2024-05-15 RX ORDER — METOPROLOL SUCCINATE 50 MG/1
50 TABLET, EXTENDED RELEASE ORAL
Refills: 0 | Status: DISCONTINUED
End: 2024-05-15

## 2024-05-15 RX ORDER — METOPROLOL SUCCINATE 50 MG/1
50 TABLET, EXTENDED RELEASE ORAL DAILY
Refills: 0 | Status: ACTIVE | COMMUNITY

## 2024-05-15 RX ORDER — METOPROLOL SUCCINATE 50 MG/1
50 TABLET, EXTENDED RELEASE ORAL DAILY
Qty: 135 | Refills: 3 | Status: DISCONTINUED | COMMUNITY
Start: 2018-10-25 | End: 2024-05-15

## 2024-05-15 RX ORDER — METFORMIN HYDROCHLORIDE 500 MG/1
500 TABLET, COATED ORAL
Refills: 0 | Status: ACTIVE | COMMUNITY

## 2024-05-15 RX ORDER — ATORVASTATIN CALCIUM 20 MG/1
20 TABLET, FILM COATED ORAL
Refills: 0 | Status: ACTIVE | COMMUNITY

## 2024-05-15 NOTE — DISCUSSION/SUMMARY
[FreeTextEntry1] : 1. HTN: continue HCTZ 25mg daily and Toprol XL 100mg daily (high risk medication with no signs of toxicity). Recommend increasing losartan to 100mg daily for better BP control. Low salt diet. Goal BP less than 130/80.  3. HLD: continue atorvastatin 40mg daily. Smoking cessation importance.   4. GAGE: on CPAP.   5. Dilated Ascending Aorta: root and ascending aorta at 4.2cm. Smoking cessation a must. Follow up echocardiogram recommended. Goal BP less than 130/80.  Follow up in 6 months. [EKG obtained to assist in diagnosis and management of assessed problem(s)] : EKG obtained to assist in diagnosis and management of assessed problem(s)

## 2024-05-15 NOTE — PHYSICAL EXAM
[Normal] : moves all extremities, no focal deficits, normal speech [de-identified] : No carotid bruits auscultated bilaterally [de-identified] : psoriatic plaques bilateral elbows

## 2024-05-15 NOTE — HISTORY OF PRESENT ILLNESS
[FreeTextEntry1] : Historical Perspective: 59 year old male, active smoker, psoriatic arthritis, HTN, HLD, EtOH abuse, GAGE on CPAP, and DM presents for a cardiac evaluation. Patient states he hasn't been seen by a cardiologist in many years. Patient is sedentary but has no chest pain, SOB, or palpitations.   There is no history of MI, CVA, CHF, or previous coronary intervention.  Current Health Status: Lost to follow up for about two years. Patient was sober for about two years. Relapsed with EtOH, December-January 2024. Went back to rehab and has been sober since. Heavily involved in AA now. He continues to smoke but is actively trying to quit. Patient has no chest pain, dyspnea or palpitations. He is living in [place], but comes back to  very frequently.

## 2024-05-15 NOTE — CARDIOLOGY SUMMARY
[de-identified] : 5/15/2024, Sinus Bradycardia with fusion beat. 03/17/2022, NSR, left axis deviation [de-identified] : 4/1/2022, Pharmacologic Nuclear Stress Testing: no ischemia or evidence of prior infarction noted on SPECT images.\par   [de-identified] : 4/1/2022, LV EF 60-65%, trace MR, ascending aorta and aortic root at 4.2cm, mild-moderate TR with estimated PASP of 36mmHg. [de-identified] : 4/7/2022, SAM: no obstructive flow.

## 2024-05-15 NOTE — REVIEW OF SYSTEMS
[Fever] : no fever [Chills] : no chills [Feeling Fatigued] : feeling fatigued [Joint Pain] : joint pain [Joint Stiffness] : joint stiffness [Negative] : Neurological [FreeTextEntry5] : see HPI [de-identified] : see HPI [de-identified] : see HPI

## 2024-05-16 ENCOUNTER — APPOINTMENT (OUTPATIENT)
Dept: CARDIOLOGY | Facility: CLINIC | Age: 59
End: 2024-05-16
Payer: MEDICARE

## 2024-05-16 PROCEDURE — 93306 TTE W/DOPPLER COMPLETE: CPT

## 2024-09-03 LAB — HBA1C MFR BLD HPLC: 6.1

## 2024-11-19 ENCOUNTER — APPOINTMENT (OUTPATIENT)
Dept: CARDIOLOGY | Facility: CLINIC | Age: 59
End: 2024-11-19

## 2025-07-29 ENCOUNTER — APPOINTMENT (OUTPATIENT)
Dept: CARDIOLOGY | Facility: CLINIC | Age: 60
End: 2025-07-29
Payer: MEDICARE

## 2025-07-29 VITALS
WEIGHT: 228 LBS | HEIGHT: 70 IN | OXYGEN SATURATION: 98 % | BODY MASS INDEX: 32.64 KG/M2 | SYSTOLIC BLOOD PRESSURE: 124 MMHG | DIASTOLIC BLOOD PRESSURE: 66 MMHG | HEART RATE: 70 BPM

## 2025-07-29 DIAGNOSIS — E78.00 PURE HYPERCHOLESTEROLEMIA, UNSPECIFIED: ICD-10-CM

## 2025-07-29 DIAGNOSIS — I77.810 THORACIC AORTIC ECTASIA: ICD-10-CM

## 2025-07-29 DIAGNOSIS — I10 ESSENTIAL (PRIMARY) HYPERTENSION: ICD-10-CM

## 2025-07-29 PROCEDURE — 99214 OFFICE O/P EST MOD 30 MIN: CPT

## 2025-07-29 PROCEDURE — G2211 COMPLEX E/M VISIT ADD ON: CPT

## 2025-07-29 RX ORDER — AMLODIPINE BESYLATE 5 MG/1
5 TABLET ORAL DAILY
Refills: 0 | Status: ACTIVE | COMMUNITY

## 2025-07-29 RX ORDER — CLONAZEPAM 0.5 MG/1
0.5 TABLET ORAL 3 TIMES DAILY
Refills: 0 | Status: ACTIVE | COMMUNITY

## 2025-07-29 RX ORDER — GABAPENTIN 300 MG
300 TABLET ORAL 3 TIMES DAILY
Refills: 0 | Status: ACTIVE | COMMUNITY

## 2025-07-29 RX ORDER — BUPROPION HYDROCHLORIDE 150 MG/1
150 TABLET, EXTENDED RELEASE ORAL DAILY
Refills: 0 | Status: ACTIVE | COMMUNITY

## 2025-08-09 ENCOUNTER — NON-APPOINTMENT (OUTPATIENT)
Age: 60
End: 2025-08-09

## 2025-08-11 ENCOUNTER — APPOINTMENT (OUTPATIENT)
Dept: CARDIOLOGY | Facility: CLINIC | Age: 60
End: 2025-08-11
Payer: MEDICARE

## 2025-08-11 PROCEDURE — 93306 TTE W/DOPPLER COMPLETE: CPT
